# Patient Record
Sex: FEMALE | Race: WHITE | NOT HISPANIC OR LATINO
[De-identification: names, ages, dates, MRNs, and addresses within clinical notes are randomized per-mention and may not be internally consistent; named-entity substitution may affect disease eponyms.]

---

## 2019-11-26 ENCOUNTER — APPOINTMENT (OUTPATIENT)
Dept: OBGYN | Facility: CLINIC | Age: 44
End: 2019-11-26

## 2020-09-11 ENCOUNTER — EMERGENCY (EMERGENCY)
Facility: HOSPITAL | Age: 45
LOS: 0 days | Discharge: HOME | End: 2020-09-11
Attending: EMERGENCY MEDICINE | Admitting: EMERGENCY MEDICINE
Payer: COMMERCIAL

## 2020-09-11 VITALS
HEART RATE: 120 BPM | DIASTOLIC BLOOD PRESSURE: 86 MMHG | TEMPERATURE: 98 F | HEIGHT: 62 IN | RESPIRATION RATE: 16 BRPM | OXYGEN SATURATION: 98 % | WEIGHT: 130.07 LBS | SYSTOLIC BLOOD PRESSURE: 137 MMHG

## 2020-09-11 VITALS — HEART RATE: 96 BPM | OXYGEN SATURATION: 100 % | RESPIRATION RATE: 18 BRPM

## 2020-09-11 DIAGNOSIS — R10.9 UNSPECIFIED ABDOMINAL PAIN: ICD-10-CM

## 2020-09-11 DIAGNOSIS — N12 TUBULO-INTERSTITIAL NEPHRITIS, NOT SPECIFIED AS ACUTE OR CHRONIC: ICD-10-CM

## 2020-09-11 LAB
ALBUMIN SERPL ELPH-MCNC: 4.5 G/DL — SIGNIFICANT CHANGE UP (ref 3.5–5.2)
ALP SERPL-CCNC: 73 U/L — SIGNIFICANT CHANGE UP (ref 30–115)
ALT FLD-CCNC: 9 U/L — SIGNIFICANT CHANGE UP (ref 0–41)
ANION GAP SERPL CALC-SCNC: 13 MMOL/L — SIGNIFICANT CHANGE UP (ref 7–14)
APPEARANCE UR: ABNORMAL
APTT BLD: 34.1 SEC — SIGNIFICANT CHANGE UP (ref 27–39.2)
AST SERPL-CCNC: 20 U/L — SIGNIFICANT CHANGE UP (ref 0–41)
BACTERIA # UR AUTO: ABNORMAL
BASOPHILS # BLD AUTO: 0.03 K/UL — SIGNIFICANT CHANGE UP (ref 0–0.2)
BASOPHILS NFR BLD AUTO: 0.3 % — SIGNIFICANT CHANGE UP (ref 0–1)
BILIRUB SERPL-MCNC: 0.4 MG/DL — SIGNIFICANT CHANGE UP (ref 0.2–1.2)
BILIRUB UR-MCNC: ABNORMAL
BUN SERPL-MCNC: 8 MG/DL — LOW (ref 10–20)
CALCIUM SERPL-MCNC: 10 MG/DL — SIGNIFICANT CHANGE UP (ref 8.5–10.1)
CHLORIDE SERPL-SCNC: 97 MMOL/L — LOW (ref 98–110)
CO2 SERPL-SCNC: 26 MMOL/L — SIGNIFICANT CHANGE UP (ref 17–32)
COLOR SPEC: SIGNIFICANT CHANGE UP
CREAT SERPL-MCNC: 0.8 MG/DL — SIGNIFICANT CHANGE UP (ref 0.7–1.5)
DIFF PNL FLD: ABNORMAL
EOSINOPHIL # BLD AUTO: 0.24 K/UL — SIGNIFICANT CHANGE UP (ref 0–0.7)
EOSINOPHIL NFR BLD AUTO: 2 % — SIGNIFICANT CHANGE UP (ref 0–8)
EPI CELLS # UR: ABNORMAL /HPF
GLUCOSE SERPL-MCNC: 166 MG/DL — HIGH (ref 70–99)
GLUCOSE UR QL: 250 MG/DL
HCT VFR BLD CALC: 38.4 % — SIGNIFICANT CHANGE UP (ref 37–47)
HGB BLD-MCNC: 13 G/DL — SIGNIFICANT CHANGE UP (ref 12–16)
IMM GRANULOCYTES NFR BLD AUTO: 0.5 % — HIGH (ref 0.1–0.3)
INR BLD: 0.96 RATIO — SIGNIFICANT CHANGE UP (ref 0.65–1.3)
KETONES UR-MCNC: 15
LACTATE SERPL-SCNC: 1.7 MMOL/L — SIGNIFICANT CHANGE UP (ref 0.7–2)
LEUKOCYTE ESTERASE UR-ACNC: ABNORMAL
LIDOCAIN IGE QN: 28 U/L — SIGNIFICANT CHANGE UP (ref 7–60)
LYMPHOCYTES # BLD AUTO: 19 % — LOW (ref 20.5–51.1)
LYMPHOCYTES # BLD AUTO: 2.23 K/UL — SIGNIFICANT CHANGE UP (ref 1.2–3.4)
MCHC RBC-ENTMCNC: 31.9 PG — HIGH (ref 27–31)
MCHC RBC-ENTMCNC: 33.9 G/DL — SIGNIFICANT CHANGE UP (ref 32–37)
MCV RBC AUTO: 94.3 FL — SIGNIFICANT CHANGE UP (ref 81–99)
MONOCYTES # BLD AUTO: 0.46 K/UL — SIGNIFICANT CHANGE UP (ref 0.1–0.6)
MONOCYTES NFR BLD AUTO: 3.9 % — SIGNIFICANT CHANGE UP (ref 1.7–9.3)
NEUTROPHILS # BLD AUTO: 8.73 K/UL — HIGH (ref 1.4–6.5)
NEUTROPHILS NFR BLD AUTO: 74.3 % — SIGNIFICANT CHANGE UP (ref 42.2–75.2)
NITRITE UR-MCNC: POSITIVE
NRBC # BLD: 0 /100 WBCS — SIGNIFICANT CHANGE UP (ref 0–0)
PH UR: 5 — SIGNIFICANT CHANGE UP (ref 5–8)
PLATELET # BLD AUTO: 417 K/UL — HIGH (ref 130–400)
POTASSIUM SERPL-MCNC: 4.2 MMOL/L — SIGNIFICANT CHANGE UP (ref 3.5–5)
POTASSIUM SERPL-SCNC: 4.2 MMOL/L — SIGNIFICANT CHANGE UP (ref 3.5–5)
PROT SERPL-MCNC: 7.5 G/DL — SIGNIFICANT CHANGE UP (ref 6–8)
PROT UR-MCNC: >=300 MG/DL
PROTHROM AB SERPL-ACNC: 11 SEC — SIGNIFICANT CHANGE UP (ref 9.95–12.87)
RBC # BLD: 4.07 M/UL — LOW (ref 4.2–5.4)
RBC # FLD: 12.8 % — SIGNIFICANT CHANGE UP (ref 11.5–14.5)
RBC CASTS # UR COMP ASSIST: ABNORMAL /HPF
SODIUM SERPL-SCNC: 136 MMOL/L — SIGNIFICANT CHANGE UP (ref 135–146)
SP GR SPEC: <=1.005 — SIGNIFICANT CHANGE UP (ref 1.01–1.03)
UROBILINOGEN FLD QL: >=8 MG/DL (ref 0.2–0.2)
WBC # BLD: 11.75 K/UL — HIGH (ref 4.8–10.8)
WBC # FLD AUTO: 11.75 K/UL — HIGH (ref 4.8–10.8)
WBC UR QL: ABNORMAL /HPF

## 2020-09-11 PROCEDURE — 99285 EMERGENCY DEPT VISIT HI MDM: CPT

## 2020-09-11 PROCEDURE — 74177 CT ABD & PELVIS W/CONTRAST: CPT | Mod: 26

## 2020-09-11 RX ORDER — CEFTRIAXONE 500 MG/1
1000 INJECTION, POWDER, FOR SOLUTION INTRAMUSCULAR; INTRAVENOUS ONCE
Refills: 0 | Status: COMPLETED | OUTPATIENT
Start: 2020-09-11 | End: 2020-09-11

## 2020-09-11 RX ORDER — SODIUM CHLORIDE 9 MG/ML
1000 INJECTION, SOLUTION INTRAVENOUS ONCE
Refills: 0 | Status: COMPLETED | OUTPATIENT
Start: 2020-09-11 | End: 2020-09-11

## 2020-09-11 RX ORDER — CEFPODOXIME PROXETIL 100 MG
1 TABLET ORAL
Qty: 20 | Refills: 0
Start: 2020-09-11 | End: 2020-09-20

## 2020-09-11 RX ORDER — PHENAZOPYRIDINE HCL 100 MG
1 TABLET ORAL
Qty: 10 | Refills: 0
Start: 2020-09-11 | End: 2020-09-15

## 2020-09-11 RX ADMIN — CEFTRIAXONE 100 MILLIGRAM(S): 500 INJECTION, POWDER, FOR SOLUTION INTRAMUSCULAR; INTRAVENOUS at 21:30

## 2020-09-11 RX ADMIN — SODIUM CHLORIDE 1000 MILLILITER(S): 9 INJECTION, SOLUTION INTRAVENOUS at 20:23

## 2020-09-11 RX ADMIN — SODIUM CHLORIDE 1000 MILLILITER(S): 9 INJECTION, SOLUTION INTRAVENOUS at 21:29

## 2020-09-11 NOTE — ED PROVIDER NOTE - CLINICAL SUMMARY MEDICAL DECISION MAKING FREE TEXT BOX
44yo F dysuria and urinary frequency for 4 days, suprapubic and right lower back pain since yesterday. +ve UA. +leukocytosis. No fever. Will treat as pyelo. F/u PMD, return precautions given.

## 2020-09-11 NOTE — ED PROVIDER NOTE - OBJECTIVE STATEMENT
Patient c/o right flank pain, urgency , mild dysuria for past several days, No fever, no N/V. LMP 1 week ago and normal

## 2020-09-11 NOTE — ED ADULT TRIAGE NOTE - CHIEF COMPLAINT QUOTE
Came in for complaints of burning on urination. Pt. also complain of abdominal cramping  radiates to the right lower back.

## 2020-09-11 NOTE — ED PROVIDER NOTE - PATIENT PORTAL LINK FT
You can access the FollowMyHealth Patient Portal offered by [facility] by registering at the following website: [email or web address]. By joining [employer]’s FollowMyHealth portal, you will also be able to view your health information using other applications (apps) compatible with our system.

## 2020-09-11 NOTE — ED PROVIDER NOTE - ATTENDING CONTRIBUTION TO CARE
44yo F with no sig PMHx presents for dysuria and urinary frequency for 4 days, suprapubic and right lower back pain since yesterday. Pt taking pyridum since yesterday and took 1 dose of bactrim that was leftover from previous script. H/o  x1. +ETOH use, approx 2-3 drinks 5times a week. Denies fever, chills, CP, SOB, cough, nausea, vomiting, diarrhea.    Vital signs reviewed  GENERAL: Patient nontoxic appearing, NAD  HEAD: NCAT  EYES: Anicteric  ENT: MMM  RESPIRATORY: Normal respiratory effort. CTA B/L. No wheezing, rales, rhonchi  CARDIOVASCULAR: Regular rate and rhythm  ABDOMEN: Soft. Nondistended. Mild-moderate suprapubic TTP. Mild RUQ and RLQ TTP. No guarding or rebound. Right CVA tenderness.  MUSCULOSKELETAL/EXTREMITIES: Brisk cap refill. Equal radial pulses.   SKIN:  Warm and dry. No acute rash on abdomen  NEURO: AAOx3. No gross FND.

## 2021-07-08 PROBLEM — Z00.00 ENCOUNTER FOR PREVENTIVE HEALTH EXAMINATION: Status: ACTIVE | Noted: 2021-07-08

## 2022-04-21 ENCOUNTER — TRANSCRIPTION ENCOUNTER (OUTPATIENT)
Age: 47
End: 2022-04-21

## 2022-11-03 ENCOUNTER — EMERGENCY (EMERGENCY)
Facility: HOSPITAL | Age: 47
LOS: 0 days | Discharge: HOME | End: 2022-11-03
Attending: EMERGENCY MEDICINE | Admitting: EMERGENCY MEDICINE

## 2022-11-03 VITALS
RESPIRATION RATE: 20 BRPM | TEMPERATURE: 97 F | SYSTOLIC BLOOD PRESSURE: 142 MMHG | OXYGEN SATURATION: 99 % | DIASTOLIC BLOOD PRESSURE: 90 MMHG | WEIGHT: 104.94 LBS | HEART RATE: 76 BPM
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
## 2022-11-03 DIAGNOSIS — M79.644 PAIN IN RIGHT FINGER(S): ICD-10-CM
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
## 2022-11-03 DIAGNOSIS — S61.101A UNSPECIFIED OPEN WOUND OF RIGHT THUMB WITH DAMAGE TO NAIL, INITIAL ENCOUNTER: ICD-10-CM

## 2022-11-03 PROCEDURE — 99283 EMERGENCY DEPT VISIT LOW MDM: CPT | Mod: 25

## 2022-11-03 PROCEDURE — 11730 AVULSION NAIL PLATE SIMPLE 1: CPT

## 2022-11-03 NOTE — ED ADULT NURSE NOTE - NSFALLRSKASSESSTYPE_ED_ALL_ED
Patient left message stating he was in the hospital for 7 days and is asking if Dr. James can take a look at his chart and give his insight. He was admitted with LUE weakness.    Initial (On Arrival)

## 2022-11-03 NOTE — ED PROVIDER NOTE - OBJECTIVE STATEMENT
46 yo female no sig hx present c/o right thumb nail pain. reported she was walking her dog earlier today and it suddenly pulled it's leash. The leash pulled part of her nail out. bleeding improved after compression. pain worsen with nail movement. denies other injury/numbness/weakness to right hand/

## 2022-11-03 NOTE — ED PROVIDER NOTE - PATIENT PORTAL LINK FT
You can access the FollowMyHealth Patient Portal offered by [facility] by registering at the following website: [email or web address]. By joining [employer]’s FollowMyHealth portal, you will also be able to view your health information using other applications (apps) compatible with our system.

## 2022-11-03 NOTE — ED PROVIDER NOTE - CARE PROVIDER_API CALL
Myles Hernandez)  Orthopaedic Surgery  [address] [place] [address]  Phone: [phone]  Fax: [phone]  Follow Up Time:

## 2022-11-03 NOTE — ED PROCEDURE NOTE - GENERAL PROCEDURE DETAILS
nail was soaked in betaine/NS solution. finger was blocked. nail was lifted and inserted back into nail matrix. nail secured with bandage

## 2022-11-03 NOTE — ED PROVIDER NOTE - NSFOLLOWUPINSTRUCTIONS_ED_ALL_ED_FT
Nail Avulsion    WHAT YOU NEED TO KNOW:    Nail avulsion is when part or all of a nail is torn away or removed from the nail bed. Avulsion may happen on your finger or toe. Common causes include ingrown nail, injury, or infection. The nail bed will form a hard layer and then a new nail may grow. The nail bed will be sensitive until the hard layer forms. You will need to keep it covered to prevent infection or more injury. You may need to care for your nail area for several months as the new nail grows.  A piece of foil is inserted into the nail matrix for the nail to re-grow. The foil is secured with 2 absorbable sutures at the corner and the sutures will break down within 1-2 weeks. Once your nail regrow and it will push away the foil and the foil will fall off by itself.       DISCHARGE INSTRUCTIONS:    Return to the emergency department if:   •Blood soaks through your bandage.  •You have a red streak running up your leg or arm.    Contact your healthcare provider if:   •You have a fever or chills.  •Your injured area is red, swollen, or draining pus.   •You have new or worsening pain, or pain that does not get better with medicine.  •You have questions or concerns about your condition or care.      Medicines:   •Antibiotics may help treat or prevent a bacterial infection.  •Acetaminophen decreases pain and fever. It is available without a doctor's order. Ask how much to take and how often to take it. Follow directions. Acetaminophen can cause liver damage if not taken correctly.  •NSAIDs, such as ibuprofen, help decrease swelling, pain, and fever. This medicine is available with or without a doctor's order. NSAIDs can cause stomach bleeding or kidney problems in certain people. If you take blood thinner medicine, always ask your healthcare provider if NSAIDs are safe for you. Always read the medicine label and follow directions.    •Take your medicine as directed. Contact your healthcare provider if you think your medicine is not helping or if you have side effects. Tell him or her if you are allergic to any medicine. Keep a list of the medicines, vitamins, and herbs you take. Include the amounts, and when and why you take them. Bring the list or the pill bottles to follow-up visits. Carry your medicine list with you in case of an emergency.    Self-care:   •Keep your nail area clean, dry, and covered. When you are allowed to bathe, carefully wash the area with soap and water. Put on a clean, new bandage. Do not use an adhesive bandage. It may stick to the wound and cause pain when you remove it. Ask your healthcare provider what kind of bandage to use. Change your bandage when it gets wet or dirty. Your healthcare provider may suggest that you change the bandage every 24 hours for the first few days.  •Elevate your hand or foot above the level of your heart as often as you can for 24 hours. This will help decrease swelling and pain. Prop your hand or foot on pillows or blankets to keep it elevated comfortably.   •Apply ice on your wound area for 15 to 20 minutes every hour or as directed. Use an ice pack, or put crushed ice in a plastic bag. Cover it with a towel. Ice helps prevent tissue damage and decreases swelling and pain.  •Do not wear tight shoes or shoes that do not fit well. Do not wear tights or pantyhose. Wear cotton socks.  •Ask when you can return to work, school, or your usual sports and activities.    Follow up with your healthcare provider as directed: You may be referred to a hand or foot specialist. Write down your questions so you remember to ask them during your visits.

## 2022-11-03 NOTE — ED PROVIDER NOTE - PHYSICAL EXAMINATION
CONSTITUTIONAL: Well-appearing; well-nourished; in no apparent distress.   MS: right hand and thumb with FROM. nv intact.   SKIN: partially avulsed right thumb nail from nail matrix. no bleeding.   NEURO/PSYCH: A & O x 4; grossly unremarkable.

## 2022-11-03 NOTE — ED ADULT NURSE NOTE - NS ED NURSE DC INFO COMPLEXITY
Oriented - self; Oriented - place; Oriented - time Simple: Patient demonstrates quick and easy understanding

## 2022-11-03 NOTE — ED PROCEDURE NOTE - NS ED ATTENDING STATEMENT MOD
This was a shared visit with the MICKY. I reviewed and verified the documentation and independently performed the documented:
This was a shared visit with the MICKY. I reviewed and verified the documentation and independently performed the documented:

## 2022-12-01 ENCOUNTER — EMERGENCY (EMERGENCY)
Facility: HOSPITAL | Age: 47
LOS: 0 days | Discharge: HOME | End: 2022-12-01
Attending: EMERGENCY MEDICINE | Admitting: EMERGENCY MEDICINE

## 2022-12-01 VITALS
OXYGEN SATURATION: 97 % | DIASTOLIC BLOOD PRESSURE: 96 MMHG | HEIGHT: 62 IN | RESPIRATION RATE: 20 BRPM | SYSTOLIC BLOOD PRESSURE: 141 MMHG | HEART RATE: 113 BPM | WEIGHT: 110.01 LBS | TEMPERATURE: 97 F

## 2022-12-01 DIAGNOSIS — F17.200 NICOTINE DEPENDENCE, UNSPECIFIED, UNCOMPLICATED: ICD-10-CM

## 2022-12-01 DIAGNOSIS — R20.2 PARESTHESIA OF SKIN: ICD-10-CM

## 2022-12-01 DIAGNOSIS — R00.2 PALPITATIONS: ICD-10-CM

## 2022-12-01 PROBLEM — Z78.9 OTHER SPECIFIED HEALTH STATUS: Chronic | Status: ACTIVE | Noted: 2022-11-04

## 2022-12-01 LAB
ALBUMIN SERPL ELPH-MCNC: 4.6 G/DL — SIGNIFICANT CHANGE UP (ref 3.5–5.2)
ALP SERPL-CCNC: 59 U/L — SIGNIFICANT CHANGE UP (ref 30–115)
ALT FLD-CCNC: 9 U/L — SIGNIFICANT CHANGE UP (ref 0–41)
ANION GAP SERPL CALC-SCNC: 11 MMOL/L — SIGNIFICANT CHANGE UP (ref 7–14)
AST SERPL-CCNC: 25 U/L — SIGNIFICANT CHANGE UP (ref 0–41)
BASOPHILS # BLD AUTO: 0.02 K/UL — SIGNIFICANT CHANGE UP (ref 0–0.2)
BASOPHILS NFR BLD AUTO: 0.2 % — SIGNIFICANT CHANGE UP (ref 0–1)
BILIRUB SERPL-MCNC: 0.5 MG/DL — SIGNIFICANT CHANGE UP (ref 0.2–1.2)
BUN SERPL-MCNC: 8 MG/DL — LOW (ref 10–20)
CALCIUM SERPL-MCNC: 9.6 MG/DL — SIGNIFICANT CHANGE UP (ref 8.4–10.5)
CHLORIDE SERPL-SCNC: 99 MMOL/L — SIGNIFICANT CHANGE UP (ref 98–110)
CO2 SERPL-SCNC: 23 MMOL/L — SIGNIFICANT CHANGE UP (ref 17–32)
CREAT SERPL-MCNC: 0.6 MG/DL — LOW (ref 0.7–1.5)
EGFR: 111 ML/MIN/1.73M2 — SIGNIFICANT CHANGE UP
EOSINOPHIL # BLD AUTO: 0.09 K/UL — SIGNIFICANT CHANGE UP (ref 0–0.7)
EOSINOPHIL NFR BLD AUTO: 0.9 % — SIGNIFICANT CHANGE UP (ref 0–8)
GLUCOSE SERPL-MCNC: 84 MG/DL — SIGNIFICANT CHANGE UP (ref 70–99)
HCG SERPL QL: NEGATIVE — SIGNIFICANT CHANGE UP
HCT VFR BLD CALC: 40.2 % — SIGNIFICANT CHANGE UP (ref 37–47)
HGB BLD-MCNC: 13.8 G/DL — SIGNIFICANT CHANGE UP (ref 12–16)
IMM GRANULOCYTES NFR BLD AUTO: 0.2 % — SIGNIFICANT CHANGE UP (ref 0.1–0.3)
LYMPHOCYTES # BLD AUTO: 2.98 K/UL — SIGNIFICANT CHANGE UP (ref 1.2–3.4)
LYMPHOCYTES # BLD AUTO: 29.9 % — SIGNIFICANT CHANGE UP (ref 20.5–51.1)
MAGNESIUM SERPL-MCNC: 1.9 MG/DL — SIGNIFICANT CHANGE UP (ref 1.8–2.4)
MCHC RBC-ENTMCNC: 32.5 PG — HIGH (ref 27–31)
MCHC RBC-ENTMCNC: 34.3 G/DL — SIGNIFICANT CHANGE UP (ref 32–37)
MCV RBC AUTO: 94.8 FL — SIGNIFICANT CHANGE UP (ref 81–99)
MONOCYTES # BLD AUTO: 0.55 K/UL — SIGNIFICANT CHANGE UP (ref 0.1–0.6)
MONOCYTES NFR BLD AUTO: 5.5 % — SIGNIFICANT CHANGE UP (ref 1.7–9.3)
NEUTROPHILS # BLD AUTO: 6.29 K/UL — SIGNIFICANT CHANGE UP (ref 1.4–6.5)
NEUTROPHILS NFR BLD AUTO: 63.3 % — SIGNIFICANT CHANGE UP (ref 42.2–75.2)
NRBC # BLD: 0 /100 WBCS — SIGNIFICANT CHANGE UP (ref 0–0)
PLATELET # BLD AUTO: 327 K/UL — SIGNIFICANT CHANGE UP (ref 130–400)
POTASSIUM SERPL-MCNC: 4.6 MMOL/L — SIGNIFICANT CHANGE UP (ref 3.5–5)
POTASSIUM SERPL-SCNC: 4.6 MMOL/L — SIGNIFICANT CHANGE UP (ref 3.5–5)
PROT SERPL-MCNC: 7.5 G/DL — SIGNIFICANT CHANGE UP (ref 6–8)
RBC # BLD: 4.24 M/UL — SIGNIFICANT CHANGE UP (ref 4.2–5.4)
RBC # FLD: 13 % — SIGNIFICANT CHANGE UP (ref 11.5–14.5)
SODIUM SERPL-SCNC: 133 MMOL/L — LOW (ref 135–146)
TROPONIN T SERPL-MCNC: <0.01 NG/ML — SIGNIFICANT CHANGE UP
WBC # BLD: 9.95 K/UL — SIGNIFICANT CHANGE UP (ref 4.8–10.8)
WBC # FLD AUTO: 9.95 K/UL — SIGNIFICANT CHANGE UP (ref 4.8–10.8)

## 2022-12-01 PROCEDURE — 70450 CT HEAD/BRAIN W/O DYE: CPT | Mod: 26,MA

## 2022-12-01 PROCEDURE — 71045 X-RAY EXAM CHEST 1 VIEW: CPT | Mod: 26

## 2022-12-01 PROCEDURE — 93010 ELECTROCARDIOGRAM REPORT: CPT

## 2022-12-01 PROCEDURE — 99285 EMERGENCY DEPT VISIT HI MDM: CPT

## 2022-12-01 NOTE — ED PROVIDER NOTE - PHYSICAL EXAMINATION
Vital Signs: I have reviewed the initial vital signs.  Constitutional: well-nourished, no acute distress, normocephalic  Eyes: PERRLA, EOMI  clear conjunctiva  Cardiovascular: regular rate, regular rhythm, no murmur appreciated  Respiratory: unlabored respiratory effort, clear to auscultation bilaterally  Gastrointestinal: soft, non-tender, non-distended  abdomen, no pulsatile mass  Musculoskeletal: supple neck, no lower extremity edema, no bony tenderness  Integumentary: warm, dry, no rash  Neurologic: awake, alert, cranial nerves II-XII grossly intact, extremities’ motor and sensory functions grossly intact, no focal deficits, GCS 15

## 2022-12-01 NOTE — ED PROVIDER NOTE - CLINICAL SUMMARY MEDICAL DECISION MAKING FREE TEXT BOX
Patient with bilateral upper extremity paresthesias.  CT head and  screening labs grossly unremarkable.  Patient stable for discharge outpatient follow-up.  Patient has no neurological deficits and the bilateral symptoms make this less likely an acute neurological process.

## 2022-12-01 NOTE — ED PROVIDER NOTE - NS ED ROS FT
Review of Systems    Constitutional: (-) fever/ chills (-)loss of appetite or  weight loss  Eyes (-) visual changes  Cardiovascular: (-) chest pain, (-) syncope (-) palpitations  Respiratory: (-) cough, (-) shortness of breath  Gastrointestinal: (-) vomiting, (-) diarrhea (-) abdominal pain  neck: (-) neck pain or stiffness  Musculoskeletal:  (-) back pain, (-) joint pain   Integumentary: (-) rash, (-) swelling  Neurological: (-) headache, (-) altered mental status

## 2022-12-01 NOTE — ED PROVIDER NOTE - NS ED ATTENDING STATEMENT MOD
149.86 This was a shared visit with the MICKY. I reviewed and verified the documentation and independently performed the documented:

## 2022-12-01 NOTE — ED ADULT TRIAGE NOTE - CHIEF COMPLAINT QUOTE
"I was grocery shopping today and all of a sudden my jaw started hurting and burning. I felt nauseous and want to make sure nothing is going on with my heart"

## 2022-12-01 NOTE — ED PROVIDER NOTE - OBJECTIVE STATEMENT
48 y/o female presents to the Ed with palpitations and tingling to digits. no weakness to extremities. patient current smoker. no calf pain or swelling. patient denies any change to medications, diets or supplements. +recent travel to [place]. patient denies any visual changes. patient denies any chest pain, sob, abdominal pain. no ataxia.

## 2022-12-01 NOTE — ED ADULT NURSE NOTE - OBJECTIVE STATEMENT
pt complaining of tingling to hands, feet, and face  "its been happening for a while but today is worse"   pt has never followed up with doctor for these symptoms

## 2022-12-01 NOTE — ED PROVIDER NOTE - NSFOLLOWUPCLINICS_GEN_ALL_ED_FT
[facility]  Neurology  [address] [place] [address], [address]  [place] [address]  Phone: [phone]  Fax:

## 2022-12-01 NOTE — ED PROVIDER NOTE - ATTENDING APP SHARED VISIT CONTRIBUTION OF CARE
47-year-old female here for evaluation of palpitations tingling to bilateral upper extremities that started prior to arrival.  Patient denies any weakness but says she has a burning sensation in her jaw.  She also has paresthesias to bilateral upper extremities.  Here exam patient no distress S1-S2 clear to auscultation belly soft nontender cranial nerves II through XII intact motor five 5 x 4 sensation grossly intact normal finger-nose  Impression  Patient with bilateral upper extremity paresthesias.  CT head and  screening labs grossly unremarkable.  Patient stable for discharge outpatient follow-up.  Patient has no neurological deficits and the bilateral symptoms make this less likely an acute neurological process.

## 2023-12-04 NOTE — ED PROVIDER NOTE - NS ED ATTENDING STATEMENT MOD
History  Chief Complaint   Patient presents with    Knee Injury     Pt reports slipping on ice lastnight and landing on her left knee  Woke up with swelling and pain  Patient is a 25-year-old female past medical history of ADD, anxiety presenting for left knee pain  Patient states that she slipped on ice yesterday falling directly on her knee and this morning woke up with severe pain and swelling  She states that she was woke from sleep at 2:00 a m , roughly 12 hours ago and since that time has had constant pain which is worse with movement and mildly improved with 800 mg of Motrin which she has taken twice and 1000 mg of Tylenol which is also taken twice since onset of pain  She states that she has been icing it and keeping it elevated  She states yesterday she may have also twisted her leg when she went down but did not hear any crack or pop  She is no longer able to bear weight on the leg but states that she was able to yesterday  Denies any numbness or tingling or other injuries  None       Past Medical History:   Diagnosis Date    Kidney stones        History reviewed  No pertinent surgical history  History reviewed  No pertinent family history  I have reviewed and agree with the history as documented  E-Cigarette/Vaping    E-Cigarette Use Never User      E-Cigarette/Vaping Substances     Social History     Tobacco Use    Smoking status: Never Smoker    Smokeless tobacco: Never Used   Substance Use Topics    Alcohol use: Never     Frequency: Never    Drug use: Yes     Types: Marijuana       Review of Systems   All other systems reviewed and are negative  Physical Exam  Physical Exam  Vitals signs reviewed  Constitutional:       General: She is not in acute distress  Appearance: Normal appearance  She is not ill-appearing     HENT:      Mouth/Throat:      Mouth: Mucous membranes are moist    Eyes:      Conjunctiva/sclera: Conjunctivae normal    Neck:
PA completed for sublocade. Current PA expires 1/4/24. PA faxed to [facility].   
Musculoskeletal: Neck supple  Cardiovascular:      Rate and Rhythm: Normal rate and regular rhythm  Pulses: Normal pulses  Pulmonary:      Effort: Pulmonary effort is normal    Abdominal:      General: Abdomen is flat  Tenderness: There is no abdominal tenderness  Musculoskeletal:         General: Swelling and tenderness present  Comments: Patient has moderate to severe edema to the superior aspect of the knee with tenderness most so there, negative ligamentous testing, intact distal motor and sensation, unable to bear weight   Skin:     General: Skin is warm and dry  Neurological:      General: No focal deficit present  Mental Status: She is alert  Sensory: No sensory deficit  Psychiatric:         Mood and Affect: Mood normal          Vital Signs  ED Triage Vitals [12/31/20 1208]   Temperature Pulse Respirations Blood Pressure SpO2   98 2 °F (36 8 °C) 68 18 109/58 98 %      Temp Source Heart Rate Source Patient Position - Orthostatic VS BP Location FiO2 (%)   Oral Monitor -- Right arm --      Pain Score       --           Vitals:    12/31/20 1208   BP: 109/58   Pulse: 68         Visual Acuity      ED Medications  Medications   oxyCODONE (ROXICODONE) IR tablet 5 mg (has no administration in time range)       Diagnostic Studies  Results Reviewed     None                 XR knee 4+ vw left injury   ED Interpretation by Emelia Hill DO (12/31 1342)   NAD      Final Result by Sofia Alex DO (12/31 1403)      Large joint effusion with no underlying osseous abnormality  Workstation performed: [id number]                    Procedures  Procedures         ED Course                                           MDM  Number of Diagnoses or Management Options  Injury of left knee, initial encounter:   Diagnosis management comments: Patient is a 48-year-old female past medical history of ADD, anxiety presenting with left knee injury    Patient is well-appearing bedside
stable vitals and in no acute distress  He does have large effusion but no fractures on x-ray  I suspect ligamentous injury versus quadriceps tendon rupture and will place in knee immobilizer, crutches, give pain control and have advised orthopedic follow-up  Have discussed continued use of NSAIDs and Tylenol as well as appropriate dosing and patient mother bedside state they understand      Disposition  Final diagnoses:   Injury of left knee, initial encounter     Time reflects when diagnosis was documented in both MDM as applicable and the Disposition within this note     Time User Action Codes Description Comment    12/31/2020  1:49 PM Raphael Rockwell [G50 [id number]] Injury of left knee, initial encounter       ED Disposition     ED Disposition Condition Date/Time Comment    Discharge Stable Thu Dec 31, 2020  1:49 PM Mayo Powers discharge to home/self care  Follow-up Information     Follow up With Specialties Details Why Contact Info Additional [address] [facility] [place] Schedule an appointment as soon as possible for a visit   [address] [facility] [address] [place] [address] [facility] [place] [address], [address], [place], [place], [address] [phone]          There are no discharge medications for this patient  No discharge procedures on file      PDMP Review     None          ED Provider  Electronically Signed by           David Wang DO  12/31/20 9662
This was a shared visit with the MICKY. I reviewed and verified the documentation and independently performed the documented:

## 2025-03-04 ENCOUNTER — OUTPATIENT (OUTPATIENT)
Dept: OUTPATIENT SERVICES | Facility: HOSPITAL | Age: 50
LOS: 1 days | End: 2025-03-04
Payer: COMMERCIAL

## 2025-03-04 DIAGNOSIS — Z12.31 ENCOUNTER FOR SCREENING MAMMOGRAM FOR MALIGNANT NEOPLASM OF BREAST: ICD-10-CM

## 2025-03-04 PROCEDURE — 77067 SCR MAMMO BI INCL CAD: CPT | Mod: 26

## 2025-03-04 PROCEDURE — 77063 BREAST TOMOSYNTHESIS BI: CPT | Mod: 26

## 2025-03-04 PROCEDURE — 77067 SCR MAMMO BI INCL CAD: CPT

## 2025-03-04 PROCEDURE — 77063 BREAST TOMOSYNTHESIS BI: CPT

## 2025-03-05 DIAGNOSIS — Z12.31 ENCOUNTER FOR SCREENING MAMMOGRAM FOR MALIGNANT NEOPLASM OF BREAST: ICD-10-CM

## 2025-03-12 ENCOUNTER — OUTPATIENT (OUTPATIENT)
Dept: OUTPATIENT SERVICES | Facility: HOSPITAL | Age: 50
LOS: 1 days | End: 2025-03-12
Payer: COMMERCIAL

## 2025-03-12 DIAGNOSIS — R92.8 OTHER ABNORMAL AND INCONCLUSIVE FINDINGS ON DIAGNOSTIC IMAGING OF BREAST: ICD-10-CM

## 2025-03-12 PROCEDURE — G0279: CPT

## 2025-03-12 PROCEDURE — 77065 DX MAMMO INCL CAD UNI: CPT | Mod: RT

## 2025-03-12 PROCEDURE — 77065 DX MAMMO INCL CAD UNI: CPT | Mod: 26,RT

## 2025-03-12 PROCEDURE — 76642 ULTRASOUND BREAST LIMITED: CPT | Mod: 26,RT

## 2025-03-12 PROCEDURE — 76642 ULTRASOUND BREAST LIMITED: CPT | Mod: RT

## 2025-03-12 PROCEDURE — G0279: CPT | Mod: 26

## 2025-03-13 DIAGNOSIS — R92.8 OTHER ABNORMAL AND INCONCLUSIVE FINDINGS ON DIAGNOSTIC IMAGING OF BREAST: ICD-10-CM
